# Patient Record
Sex: MALE | Race: WHITE | NOT HISPANIC OR LATINO | Employment: FULL TIME | URBAN - NONMETROPOLITAN AREA
[De-identification: names, ages, dates, MRNs, and addresses within clinical notes are randomized per-mention and may not be internally consistent; named-entity substitution may affect disease eponyms.]

---

## 2017-01-05 PROBLEM — E78.5 HYPERLIPIDEMIA: Status: ACTIVE | Noted: 2017-01-05

## 2017-01-05 PROBLEM — I10 HYPERTENSION: Status: ACTIVE | Noted: 2017-01-05

## 2017-03-20 RX ORDER — LISINOPRIL 10 MG/1
TABLET ORAL
Qty: 90 TABLET | Refills: 3 | Status: SHIPPED | OUTPATIENT
Start: 2017-03-20 | End: 2017-03-20 | Stop reason: SDUPTHER

## 2017-03-20 RX ORDER — LISINOPRIL 10 MG/1
10 TABLET ORAL DAILY
Qty: 90 TABLET | Refills: 3 | Status: SHIPPED | OUTPATIENT
Start: 2017-03-20 | End: 2018-04-02

## 2017-03-31 ENCOUNTER — OFFICE VISIT (OUTPATIENT)
Dept: INTERNAL MEDICINE | Facility: CLINIC | Age: 37
End: 2017-03-31

## 2017-03-31 VITALS
BODY MASS INDEX: 33.61 KG/M2 | TEMPERATURE: 98.4 F | DIASTOLIC BLOOD PRESSURE: 70 MMHG | HEART RATE: 88 BPM | SYSTOLIC BLOOD PRESSURE: 115 MMHG | OXYGEN SATURATION: 98 % | HEIGHT: 67 IN | WEIGHT: 214.13 LBS

## 2017-03-31 DIAGNOSIS — I10 ESSENTIAL HYPERTENSION: Primary | ICD-10-CM

## 2017-03-31 DIAGNOSIS — Z00.00 ROUTINE GENERAL MEDICAL EXAMINATION AT A HEALTH CARE FACILITY: ICD-10-CM

## 2017-03-31 DIAGNOSIS — E78.00 PURE HYPERCHOLESTEROLEMIA: ICD-10-CM

## 2017-03-31 PROCEDURE — 99395 PREV VISIT EST AGE 18-39: CPT | Performed by: INTERNAL MEDICINE

## 2017-03-31 NOTE — PROGRESS NOTES
Chief Complaint   Patient presents with   • Annual Exam       Kenroy Melendez is a 37 y.o. male and is here for a comprehensive physical exam. The patient reports htn.     History:  No nocturia or ED  Do you take any herbs or supplements that were not prescribed by a doctor? no  Are you taking calcium supplements? no  Are you taking aspirin daily? no      Health Habits:  Dental Exam. up to date  Eye Exam. unknown  Exercise: 2 times/week.  Current exercise activities include: coaching (soccer, baseball) and walking    Health Maintenance   Topic Date Due   • TDAP/TD VACCINES (1 - Tdap) 02/17/1999   • INFLUENZA VACCINE  08/01/2016   • LIPID PANEL  03/20/2017       PMH, PSH, SocHx, FamHx, Allergies, and Medications: Reviewed and updated in the Visit Navigator.     No Known Allergies  Past Medical History:   Diagnosis Date   • Right ankle pain      No past surgical history on file.  Social History     Social History   • Marital status:      Spouse name: N/A   • Number of children: N/A   • Years of education: N/A     Occupational History   • Not on file.     Social History Main Topics   • Smoking status: Former Smoker   • Smokeless tobacco: Not on file   • Alcohol use Yes      Comment: 3 times weekly   • Drug use: No   • Sexual activity: Not on file     Other Topics Concern   • Not on file     Social History Narrative     Family History   Problem Relation Age of Onset   • Hypertension Other        Review of Systems  Review of Systems   Constitutional: Negative.  Negative for activity change, appetite change, fatigue and fever.   HENT: Negative for congestion, ear discharge, ear pain and trouble swallowing.    Eyes: Negative for photophobia and visual disturbance.   Respiratory: Negative for cough and shortness of breath.    Cardiovascular: Negative for chest pain and palpitations.   Gastrointestinal: Negative for abdominal distention, abdominal pain, constipation, diarrhea, nausea and vomiting.   Endocrine:  "Negative.    Genitourinary: Negative for dysuria, hematuria and urgency.   Musculoskeletal: Negative for arthralgias, back pain, joint swelling and myalgias.   Skin: Negative for color change and rash.   Allergic/Immunologic: Negative.    Neurological: Negative for dizziness, weakness, light-headedness and headaches.   Hematological: Negative for adenopathy. Does not bruise/bleed easily.   Psychiatric/Behavioral: Negative for agitation, confusion and dysphoric mood. The patient is not nervous/anxious.        Vitals:    03/31/17 1520   BP: 115/70   Pulse: 88   Temp: 98.4 °F (36.9 °C)   SpO2: 98%       Objective   /70  Pulse 88  Temp 98.4 °F (36.9 °C)  Ht 67\" (170.2 cm)  Wt 214 lb 2 oz (97.1 kg)  SpO2 98%  BMI 33.54 kg/m2    Physical Exam   Constitutional: He is oriented to person, place, and time. He appears well-developed and well-nourished. No distress.   HENT:   Nose: Nose normal.   Mouth/Throat: Oropharynx is clear and moist.   Eyes: Conjunctivae and EOM are normal. No scleral icterus.   Neck: No tracheal deviation present. No thyromegaly present.   Cardiovascular: Normal rate and regular rhythm.  Exam reveals no friction rub.    No murmur heard.  Pulmonary/Chest: No respiratory distress. He has no wheezes. He has no rales.   Abdominal: Soft. He exhibits no distension and no mass. There is no tenderness. There is no guarding.   Musculoskeletal: Normal range of motion. He exhibits no deformity.   Lymphadenopathy:     He has no cervical adenopathy.   Neurological: He is alert and oriented to person, place, and time. He has normal reflexes. No cranial nerve deficit. Coordination normal.   Skin: Skin is warm and dry. No rash noted. No erythema.   Psychiatric: He has a normal mood and affect. His behavior is normal. Judgment and thought content normal.        Assessment/Plan   1. Healthy male exam.   2. Patient Counseling: Including but not Limited to the following, when appropriate:  --Nutrition: Stressed " importance of moderation in sodium/caffeine intake, saturated fat and cholesterol, caloric balance, sufficient intake of fresh fruits, vegetables, fiber, calcium, iron    --Discussed the issue of estrogen replacement, calcium supplement, and the daily use of baby aspirin.    --Exercise: Stressed the importance of regular exercise.     --Substance Abuse: Discussed cessation/primary prevention of tobacco, alcohol, or other drug use; driving or other dangerous activities under the influence; availability of treatment for abuse, as indicated based on social history.      --Sexuality: Discussed sexually transmitted diseases, partner selection, use of condoms, avoidance of unintended pregnancy  and contraceptive alternatives.     --Injury prevention: Discussed safety belts, safety helmets, smoke detector, smoking near bedding or upholstery.     --Dental health: Discussed importance of regular tooth brushing, flossing, and dental visits.    --Immunizations reviewed.        3. Discussed the patient's BMI with him.  The BMI is in the acceptable range  4. No Follow-up on file.  5. Age-appropriate Screening Scheduled  6. There are no Patient Instructions on file for this visit.    Assessment/Plan     Kenroy was seen today for annual exam.    Diagnoses and all orders for this visit:    Essential hypertension stable with current meds and low-salt diet    Pure hypercholesterolemia check lipid profile. Discussed dietary restrictions

## 2018-04-02 ENCOUNTER — OFFICE VISIT (OUTPATIENT)
Dept: INTERNAL MEDICINE | Facility: CLINIC | Age: 38
End: 2018-04-02

## 2018-04-02 VITALS
TEMPERATURE: 98.3 F | HEART RATE: 87 BPM | DIASTOLIC BLOOD PRESSURE: 80 MMHG | WEIGHT: 204 LBS | OXYGEN SATURATION: 98 % | BODY MASS INDEX: 32.02 KG/M2 | HEIGHT: 67 IN | SYSTOLIC BLOOD PRESSURE: 120 MMHG

## 2018-04-02 DIAGNOSIS — E78.00 PURE HYPERCHOLESTEROLEMIA: Primary | ICD-10-CM

## 2018-04-02 DIAGNOSIS — I10 ESSENTIAL HYPERTENSION: ICD-10-CM

## 2018-04-02 PROCEDURE — 99213 OFFICE O/P EST LOW 20 MIN: CPT | Performed by: INTERNAL MEDICINE

## 2018-04-02 RX ORDER — AMLODIPINE BESYLATE 2.5 MG/1
2.5 TABLET ORAL DAILY
Qty: 30 TABLET | Refills: 5 | Status: SHIPPED | OUTPATIENT
Start: 2018-04-02 | End: 2018-06-07 | Stop reason: ALTCHOICE

## 2018-04-02 RX ORDER — LISINOPRIL 10 MG/1
TABLET ORAL
Qty: 90 TABLET | Refills: 3 | OUTPATIENT
Start: 2018-04-02

## 2018-04-02 NOTE — PROGRESS NOTES
"Subjective  Kenroy Melendez is a 38 y.o. male    HPI coming in for wellness visit and follow-up has hypertension has been complaining of occasional scratchy throat which she thinks is related to his medication. Nonsmoker no fever or chills has managed some weight loss with dietary restrictions    The following portions of the patient's history were reviewed and updated as appropriate: allergies, current medications, past family history, past medical history, past social history, past surgical history, and problem list.     Review of Systems   Constitutional: Negative.  Negative for activity change, appetite change, fatigue and fever.   HENT: Negative for congestion, ear discharge, ear pain and trouble swallowing.    Eyes: Negative for photophobia and visual disturbance.   Respiratory: Positive for cough. Negative for shortness of breath.    Cardiovascular: Negative for chest pain and palpitations.   Gastrointestinal: Negative for abdominal distention, abdominal pain, constipation, diarrhea, nausea and vomiting.   Endocrine: Negative.    Genitourinary: Negative for dysuria, hematuria and urgency.   Musculoskeletal: Negative for back pain, joint swelling and myalgias.   Skin: Negative for color change and rash.   Allergic/Immunologic: Negative.    Neurological: Negative for dizziness, weakness, light-headedness and headaches.   Hematological: Negative for adenopathy. Does not bruise/bleed easily.   Psychiatric/Behavioral: Negative for agitation, confusion and dysphoric mood. The patient is not nervous/anxious.        Visit Vitals  /80   Pulse 87   Temp 98.3 °F (36.8 °C)   Ht 170.2 cm (67\")   Wt 92.5 kg (204 lb)   SpO2 98%   BMI 31.95 kg/m²       Objective  Physical Exam   Constitutional: He is oriented to person, place, and time. He appears well-developed and well-nourished. No distress.   HENT:   Nose: Nose normal.   Mouth/Throat: Oropharynx is clear and moist.   Eyes: Conjunctivae and EOM are normal. No " scleral icterus.   Neck: No tracheal deviation present. No thyromegaly present.   Cardiovascular: Normal rate and regular rhythm.  Exam reveals no friction rub.    No murmur heard.  Pulmonary/Chest: No respiratory distress. He has no wheezes. He has no rales.   Abdominal: Soft. He exhibits no distension and no mass. There is no tenderness. There is no guarding.   Musculoskeletal: Normal range of motion. He exhibits no deformity.   Lymphadenopathy:     He has no cervical adenopathy.   Neurological: He is alert and oriented to person, place, and time. He has normal reflexes. No cranial nerve deficit. Coordination normal.   Skin: Skin is warm and dry. No rash noted. No erythema.   Psychiatric: He has a normal mood and affect. His behavior is normal. Judgment and thought content normal.       Diagnoses and all orders for this visit:  Patient up to date with immunization's. Discussed exercise program follow BP readings at home. Check routine lab work  Pure hypercholesterolemia. Check lipid profile discussed diet    Essential hypertension switch antihypertensive to amlodipine. Follow BP readings at home

## 2018-06-05 ENCOUNTER — TELEPHONE (OUTPATIENT)
Dept: INTERNAL MEDICINE | Facility: CLINIC | Age: 38
End: 2018-06-05

## 2018-06-05 NOTE — TELEPHONE ENCOUNTER
Patient states that at his last appointment Dr. Carranza switched him to Amlodipine. Patient states it makes his muscles sore and he is requesting to go back to lisinopril.

## 2018-06-05 NOTE — TELEPHONE ENCOUNTER
Patient states that when he was here on [facility] 4/2/2018 that he has received a billed because he has a deductible. He said that this was his yearly check up and should have been billed as a physical. Please advise.

## 2018-06-07 RX ORDER — LISINOPRIL 10 MG/1
10 TABLET ORAL DAILY
Qty: 90 TABLET | Refills: 3 | Status: SHIPPED | OUTPATIENT
Start: 2018-06-07 | End: 2019-06-17 | Stop reason: SDUPTHER

## 2018-06-07 NOTE — TELEPHONE ENCOUNTER
Patient came to office requesting to be switched back to Lisinopril, sent ok per Dr. Carranza, patient informed, Amlodipine discontinued.

## 2019-06-17 RX ORDER — LISINOPRIL 10 MG/1
10 TABLET ORAL DAILY
Qty: 90 TABLET | Refills: 3 | Status: SHIPPED | OUTPATIENT
Start: 2019-06-17 | End: 2020-05-20 | Stop reason: SDUPTHER

## 2020-04-20 RX ORDER — LISINOPRIL 10 MG/1
10 TABLET ORAL DAILY
Qty: 90 TABLET | Refills: 3 | OUTPATIENT
Start: 2020-04-20

## 2020-05-20 RX ORDER — LISINOPRIL 10 MG/1
10 TABLET ORAL DAILY
Qty: 30 TABLET | Refills: 0 | Status: SHIPPED | OUTPATIENT
Start: 2020-05-20 | End: 2020-05-28

## 2020-05-28 RX ORDER — LISINOPRIL 10 MG/1
10 TABLET ORAL DAILY
Qty: 30 TABLET | Refills: 0 | Status: SHIPPED | OUTPATIENT
Start: 2020-05-28 | End: 2020-07-29 | Stop reason: SDUPTHER

## 2020-07-23 RX ORDER — LISINOPRIL 10 MG/1
10 TABLET ORAL DAILY
Qty: 30 TABLET | Refills: 0 | OUTPATIENT
Start: 2020-07-23

## 2020-07-29 RX ORDER — LISINOPRIL 10 MG/1
10 TABLET ORAL DAILY
Qty: 90 TABLET | Refills: 3 | Status: SHIPPED | OUTPATIENT
Start: 2020-07-29 | End: 2021-08-04 | Stop reason: SDUPTHER

## 2020-07-29 NOTE — TELEPHONE ENCOUNTER
PT CALLED REQUESTING A REFILL FOR:  lisinopril (PRINIVIL,ZESTRIL) 10 MG tablet    [facility] - [place]

## 2020-07-30 ENCOUNTER — OFFICE VISIT (OUTPATIENT)
Dept: INTERNAL MEDICINE | Facility: CLINIC | Age: 40
End: 2020-07-30

## 2020-07-30 VITALS
WEIGHT: 192.8 LBS | SYSTOLIC BLOOD PRESSURE: 130 MMHG | BODY MASS INDEX: 30.26 KG/M2 | HEIGHT: 67 IN | TEMPERATURE: 98.2 F | HEART RATE: 91 BPM | DIASTOLIC BLOOD PRESSURE: 80 MMHG | OXYGEN SATURATION: 98 %

## 2020-07-30 DIAGNOSIS — Z00.00 ROUTINE GENERAL MEDICAL EXAMINATION AT A HEALTH CARE FACILITY: Primary | ICD-10-CM

## 2020-07-30 LAB
CHOLEST SERPL-MCNC: 202 MG/DL (ref 0–200)
HDLC SERPL-MCNC: 46 MG/DL (ref 40–60)
LDLC SERPL CALC-MCNC: 135 MG/DL (ref 0–100)
TRIGL SERPL-MCNC: 104 MG/DL (ref 0–150)
VLDLC SERPL CALC-MCNC: 20.8 MG/DL

## 2020-07-30 PROCEDURE — 99396 PREV VISIT EST AGE 40-64: CPT | Performed by: INTERNAL MEDICINE

## 2020-07-30 NOTE — PROGRESS NOTES
Chief Complaint   Patient presents with   • Annual Exam       Kenroy Melendez is a 40 y.o. male and is here for a comprehensive physical exam. The patient reports no problems.     History:  Erectile dysfunction  no  Nocturia  no      Do you take any herbs or supplements that were not prescribed by a doctor? no    Are you taking aspirin daily? no      Health Habits:  Dental Exam. unknown  Eye Exam. unknown  Exercise: 2 times/week.  Current exercise activities include: housecleaning and yard work    Health Maintenance   Topic Date Due   • TDAP/TD VACCINES (1 - Tdap) 02/17/1991   • HEPATITIS C SCREENING  01/05/2017   • LIPID PANEL  03/20/2017   • INFLUENZA VACCINE  08/01/2020   • ANNUAL PHYSICAL  07/31/2021       PMH, PSH, SocHx, FamHx, Allergies, and Medications: Reviewed and updated in the Visit Navigator.     No Known Allergies  Past Medical History:   Diagnosis Date   • Hypertension    • Right ankle pain      No past surgical history on file.  Social History     Socioeconomic History   • Marital status:      Spouse name: Not on file   • Number of children: Not on file   • Years of education: Not on file   • Highest education level: Not on file   Tobacco Use   • Smoking status: Former Smoker   • Smokeless tobacco: Never Used   Substance and Sexual Activity   • Alcohol use: Yes     Comment: 3 times weekly   • Drug use: No   • Sexual activity: Defer     Family History   Problem Relation Age of Onset   • Hypertension Other    • No Known Problems Mother    • No Known Problems Father        Review of Systems  Review of Systems   Constitutional: Negative.  Negative for activity change, appetite change, fatigue and fever.   HENT: Negative for congestion, ear discharge, ear pain and trouble swallowing.    Eyes: Negative for photophobia and visual disturbance.   Respiratory: Negative for cough and shortness of breath.    Cardiovascular: Negative for chest pain and palpitations.   Gastrointestinal: Negative for  "abdominal distention, abdominal pain, constipation, diarrhea, nausea and vomiting.   Endocrine: Negative.    Genitourinary: Negative for dysuria, hematuria and urgency.   Musculoskeletal: Negative for arthralgias, back pain, joint swelling and myalgias.   Skin: Negative for color change and rash.   Allergic/Immunologic: Negative.    Neurological: Negative for dizziness, weakness, light-headedness and headaches.   Hematological: Negative for adenopathy. Does not bruise/bleed easily.   Psychiatric/Behavioral: Negative for agitation, confusion and dysphoric mood. The patient is not nervous/anxious.        Vitals:    07/30/20 0840   BP: 130/80   Pulse: 91   Temp: 98.2 °F (36.8 °C)   SpO2: 98%       Objective   /80   Pulse 91   Temp 98.2 °F (36.8 °C) (Temporal)   Ht 170.2 cm (67\")   Wt 87.5 kg (192 lb 12.8 oz)   SpO2 98%   BMI 30.20 kg/m²     Physical Exam   Constitutional: He is oriented to person, place, and time. He appears well-developed and well-nourished. No distress.   HENT:   Nose: Nose normal.   Mouth/Throat: Oropharynx is clear and moist.   Eyes: Conjunctivae and EOM are normal. No scleral icterus.   Neck: No tracheal deviation present. No thyromegaly present.   Cardiovascular: Normal rate and regular rhythm. Exam reveals no friction rub.   No murmur heard.  Pulmonary/Chest: No respiratory distress. He has no wheezes. He has no rales.   Abdominal: Soft. He exhibits no distension and no mass. There is no tenderness. There is no guarding.   Musculoskeletal: Normal range of motion. He exhibits no deformity.   Lymphadenopathy:     He has no cervical adenopathy.   Neurological: He is alert and oriented to person, place, and time. He has normal reflexes. No cranial nerve deficit. Coordination normal.   Skin: Skin is warm and dry. No rash noted. No erythema.   Psychiatric: He has a normal mood and affect. His behavior is normal. Judgment and thought content normal.       The CVD Risk score (SANTINOAgostino et " al., 2008) failed to calculate for the following reasons:    Cannot find a previous HDL lab    Cannot find a previous total cholesterol lab    Lab Results   Component Value Date     (H) 03/13/2015     Glucose   Date Value Ref Range Status   03/13/2015 80 70 - 100 mg/dL Final     BUN   Date Value Ref Range Status   03/13/2015 18 9 - 23 mg/dL Final     Creatinine   Date Value Ref Range Status   03/13/2015 0.8 0.6 - 1.3 mg/dL Final     Sodium   Date Value Ref Range Status   03/13/2015 137 132 - 146 mmol/L Final     Potassium   Date Value Ref Range Status   03/13/2015 4.5 3.5 - 5.5 mmol/L Final     Chloride   Date Value Ref Range Status   03/13/2015 103 99 - 109 mmol/L Final     CO2   Date Value Ref Range Status   03/13/2015 30 20 - 31 mmol/L Final     Calcium   Date Value Ref Range Status   03/13/2015 9.3 8.7 - 10.4 mg/dL Final     Total Protein   Date Value Ref Range Status   03/13/2015 7.0 5.7 - 8.2 g/dL Final     Albumin   Date Value Ref Range Status   03/13/2015 4.2 3.2 - 4.8 g/dL Final     ALT (SGPT)   Date Value Ref Range Status   03/13/2015 26 7 - 40 Units/L Final     AST (SGOT)   Date Value Ref Range Status   03/13/2015 24 0 - 33 Units/L Final     Alkaline Phosphatase   Date Value Ref Range Status   03/13/2015 58 25 - 100 Units/L Final     Total Bilirubin   Date Value Ref Range Status   03/13/2015 0.4 0.3 - 1.2 mg/dL Final     Anion Gap   Date Value Ref Range Status   03/13/2015 4 3 - 11 mmol/L Final     No results found for: WBC, HGB, HCT, MCV, PLT    Assessment/Plan   1. Healthy male exam.   2. Patient Counseling: Including but not Limited to the following, when appropriate:  --Nutrition: Stressed importance of moderation in sodium/caffeine intake, saturated fat and cholesterol, caloric balance, sufficient intake of fresh fruits, vegetables, fiber  .--Discussed the issue of daily use of baby aspirin.  --Exercise: Stressed the importance of regular exercise.   --Substance Abuse: Discussed  cessation/primary prevention of tobacco, alcohol, or other drug use; driving or other dangerous activities under the influence; availability of treatment for abuse, as indicated based on social history.    --Sexuality: Discussed sexually transmitted diseases, partner selection, use of condoms and contraceptive alternatives.   --Injury prevention: Discussed safety belts, safety helmets, smoke detector, smoking near bedding or upholstery.   --Dental health: Discussed importance of regular tooth brushing, flossing, and dental visits.  --Immunizations reviewed.  -      3. Discussed the patient's BMI with him.  The BMI is in the acceptable range  4. No follow-ups on file.  5. Age-appropriate Screening Scheduled  6. There are no Patient Instructions on file for this visit.    Assessment/Plan     Kenroy was seen today for annual exam.    Diagnoses and all orders for this visit:    Routine general medical examination at a health care facility    Check lipid panel CMP along with hep C antibody

## 2020-07-31 LAB
ALBUMIN SERPL-MCNC: 4.5 G/DL (ref 3.5–5.2)
ALBUMIN/GLOB SERPL: 1.8 G/DL
ALP SERPL-CCNC: 39 U/L (ref 39–117)
ALT SERPL-CCNC: 14 U/L (ref 1–41)
AST SERPL-CCNC: 15 U/L (ref 1–40)
BILIRUB SERPL-MCNC: 0.3 MG/DL (ref 0–1.2)
BUN SERPL-MCNC: 13 MG/DL (ref 6–20)
BUN/CREAT SERPL: 13.4 (ref 7–25)
CALCIUM SERPL-MCNC: 9.9 MG/DL (ref 8.6–10.5)
CHLORIDE SERPL-SCNC: 102 MMOL/L (ref 98–107)
CO2 SERPL-SCNC: 29.8 MMOL/L (ref 22–29)
CREAT SERPL-MCNC: 0.97 MG/DL (ref 0.76–1.27)
GLOBULIN SER CALC-MCNC: 2.5 GM/DL
GLUCOSE SERPL-MCNC: 84 MG/DL (ref 65–99)
HCV AB S/CO SERPL IA: <0.1 S/CO RATIO (ref 0–0.9)
POTASSIUM SERPL-SCNC: 4.4 MMOL/L (ref 3.5–5.2)
PROT SERPL-MCNC: 7 G/DL (ref 6–8.5)
SODIUM SERPL-SCNC: 138 MMOL/L (ref 136–145)

## 2021-08-04 DIAGNOSIS — I10 ESSENTIAL HYPERTENSION: Primary | ICD-10-CM

## 2021-08-04 RX ORDER — LISINOPRIL 10 MG/1
10 TABLET ORAL DAILY
Qty: 20 TABLET | Refills: 0 | Status: SHIPPED | OUTPATIENT
Start: 2021-08-04 | End: 2021-08-30 | Stop reason: SDUPTHER

## 2021-08-16 ENCOUNTER — OFFICE VISIT (OUTPATIENT)
Dept: INTERNAL MEDICINE | Facility: CLINIC | Age: 41
End: 2021-08-16

## 2021-08-16 VITALS
HEIGHT: 67 IN | WEIGHT: 204.12 LBS | TEMPERATURE: 98.2 F | BODY MASS INDEX: 32.04 KG/M2 | OXYGEN SATURATION: 98 % | HEART RATE: 97 BPM | DIASTOLIC BLOOD PRESSURE: 78 MMHG | SYSTOLIC BLOOD PRESSURE: 130 MMHG

## 2021-08-16 DIAGNOSIS — Z00.00 ROUTINE GENERAL MEDICAL EXAMINATION AT A HEALTH CARE FACILITY: Primary | ICD-10-CM

## 2021-08-16 PROCEDURE — 99396 PREV VISIT EST AGE 40-64: CPT | Performed by: INTERNAL MEDICINE

## 2021-08-16 NOTE — PROGRESS NOTES
Chief Complaint   Patient presents with   • Annual Exam       Kenroy Melendez is a 41 y.o. male and is here for a comprehensive physical exam. The patient reports no problems.     History:  Erectile dysfunction  no  Nocturia  no      Do you take any herbs or supplements that were not prescribed by a doctor? yes    Are you taking aspirin daily? no      Health Habits:  Dental Exam. up to date  Eye Exam. up to date  Exercise: 5 times/week.  Current exercise activities include: yard work    Health Maintenance   Topic Date Due   • COVID-19 Vaccine (1) Never done   • TDAP/TD VACCINES (1 - Tdap) Never done   • LIPID PANEL  07/30/2021   • INFLUENZA VACCINE  10/01/2021   • ANNUAL PHYSICAL  08/17/2022   • HEPATITIS C SCREENING  Completed   • Pneumococcal Vaccine 0-64  Aged Out       PMH, PSH, SocHx, FamHx, Allergies, and Medications: Reviewed and updated in the Visit Navigator.     No Known Allergies  Past Medical History:   Diagnosis Date   • Hypertension    • Right ankle pain      No past surgical history on file.  Social History     Socioeconomic History   • Marital status:      Spouse name: Not on file   • Number of children: Not on file   • Years of education: Not on file   • Highest education level: Not on file   Tobacco Use   • Smoking status: Former Smoker   • Smokeless tobacco: Never Used   Substance and Sexual Activity   • Alcohol use: Yes     Comment: 3 times weekly   • Drug use: No   • Sexual activity: Defer     Family History   Problem Relation Age of Onset   • Hypertension Other    • No Known Problems Mother    • No Known Problems Father        Review of Systems  Review of Systems   Constitutional: Negative.  Negative for activity change, appetite change, fatigue and fever.   HENT: Negative for congestion, ear discharge, ear pain and trouble swallowing.    Eyes: Negative for photophobia and visual disturbance.   Respiratory: Negative for cough and shortness of breath.    Cardiovascular: Negative for  "chest pain and palpitations.   Gastrointestinal: Negative for abdominal distention, abdominal pain, constipation, diarrhea, nausea and vomiting.   Endocrine: Negative.    Genitourinary: Negative for dysuria, hematuria and urgency.   Musculoskeletal: Negative for arthralgias, back pain, joint swelling and myalgias.   Skin: Negative for color change and rash.   Allergic/Immunologic: Negative.    Neurological: Negative for dizziness, weakness, light-headedness and headaches.   Hematological: Negative for adenopathy. Does not bruise/bleed easily.   Psychiatric/Behavioral: Negative for agitation, confusion and dysphoric mood. The patient is not nervous/anxious.        Vitals:    08/16/21 1532   BP: 130/78   Pulse: 97   Temp: 98.2 °F (36.8 °C)   SpO2: 98%       Objective   /78   Pulse 97   Temp 98.2 °F (36.8 °C) (Infrared)   Ht 170.2 cm (67\")   Wt 92.6 kg (204 lb 1.9 oz)   SpO2 98%   BMI 31.97 kg/m²     Physical Exam  Constitutional:       General: He is not in acute distress.     Appearance: He is well-developed.   HENT:      Nose: Nose normal.   Eyes:      General: No scleral icterus.     Conjunctiva/sclera: Conjunctivae normal.   Neck:      Thyroid: No thyromegaly.      Trachea: No tracheal deviation.   Cardiovascular:      Rate and Rhythm: Normal rate and regular rhythm.      Heart sounds: No murmur heard.   No friction rub.   Pulmonary:      Effort: No respiratory distress.      Breath sounds: No wheezing or rales.   Abdominal:      General: There is no distension.      Palpations: Abdomen is soft. There is no mass.      Tenderness: There is no abdominal tenderness. There is no guarding.   Musculoskeletal:         General: No deformity. Normal range of motion.   Lymphadenopathy:      Cervical: No cervical adenopathy.   Skin:     General: Skin is warm and dry.      Findings: No erythema or rash.   Neurological:      Mental Status: He is alert and oriented to person, place, and time.      Cranial Nerves: No " cranial nerve deficit.      Coordination: Coordination normal.      Deep Tendon Reflexes: Reflexes are normal and symmetric.   Psychiatric:         Behavior: Behavior normal.         Thought Content: Thought content normal.         Judgment: Judgment normal.         The 10-year CVD risk score (Francisco et al., 2008) is: 6.9%    Values used to calculate the score:      Age: 41 years      Sex: Male      Diabetic: No      Tobacco smoker: No      Systolic Blood Pressure: 130 mmHg      Is BP treated: Yes      HDL Cholesterol: 46 mg/dL      Total Cholesterol: 202 mg/dL    Lab Results   Component Value Date    CHLPL 202 (H) 07/30/2020    TRIG 104 07/30/2020    HDL 46 07/30/2020     (H) 07/30/2020     Glucose   Date Value Ref Range Status   03/13/2015 80 70 - 100 mg/dL Final     BUN   Date Value Ref Range Status   07/30/2020 13 6 - 20 mg/dL Final     Creatinine   Date Value Ref Range Status   07/30/2020 0.97 0.76 - 1.27 mg/dL Final     Sodium   Date Value Ref Range Status   07/30/2020 138 136 - 145 mmol/L Final     Potassium   Date Value Ref Range Status   07/30/2020 4.4 3.5 - 5.2 mmol/L Final     Chloride   Date Value Ref Range Status   07/30/2020 102 98 - 107 mmol/L Final     Total CO2   Date Value Ref Range Status   07/30/2020 29.8 (H) 22.0 - 29.0 mmol/L Final     Calcium   Date Value Ref Range Status   07/30/2020 9.9 8.6 - 10.5 mg/dL Final     Total Protein   Date Value Ref Range Status   03/13/2015 7.0 5.7 - 8.2 g/dL Final     Albumin   Date Value Ref Range Status   07/30/2020 4.50 3.50 - 5.20 g/dL Final     ALT (SGPT)   Date Value Ref Range Status   07/30/2020 14 1 - 41 U/L Final     AST (SGOT)   Date Value Ref Range Status   07/30/2020 15 1 - 40 U/L Final     Alkaline Phosphatase   Date Value Ref Range Status   07/30/2020 39 39 - 117 U/L Final     Total Bilirubin   Date Value Ref Range Status   07/30/2020 0.3 0.0 - 1.2 mg/dL Final     eGFR Non  Am   Date Value Ref Range Status   07/30/2020 86 >60  mL/min/1.73 Final     A/G Ratio   Date Value Ref Range Status   07/30/2020 1.8 g/dL Final     BUN/Creatinine Ratio   Date Value Ref Range Status   07/30/2020 13.4 7.0 - 25.0 Final     Anion Gap   Date Value Ref Range Status   03/13/2015 4 3 - 11 mmol/L Final     No results found for: WBC, HGB, HCT, MCV, PLT    Assessment/Plan   1. Healthy male exam.   2. Patient Counseling: Including but not Limited to the following, when appropriate:  --Nutrition: Stressed importance of moderation in sodium/caffeine intake, saturated fat and cholesterol, caloric balance, sufficient intake of fresh fruits, vegetables, fiber  .-  --Exercise: Stressed the importance of regular exercise.   --Substance Abuse: Discussed cessation/primary prevention of tobacco, alcohol, or other drug use; driving or other dangerous activities under the influence; availability of treatment for abuse, as indicated based on social history.    --Sexuality: Discussed sexually transmitted diseases, partner selection, use of condoms and contraceptive alternatives.   --Injury prevention: Discussed safety belts, safety helmets, smoke detector, smoking near bedding or upholstery.   --Dental health: Discussed importance of regular tooth brushing, flossing, and dental visits.  --Immunizations reviewed.  --      3. Discussed the patient's BMI with him.  The BMI is above average; BMI management plan is completed  4. No follow-ups on file.  5. Age-appropriate Screening Scheduled  6. There are no Patient Instructions on file for this visit.    Assessment/Plan     Diagnoses and all orders for this visit:    1. Routine general medical examination at a health care facility ([facility])  Counseled about the need to get the Covid vaccine.  He does not want 1

## 2021-08-30 DIAGNOSIS — I10 ESSENTIAL HYPERTENSION: ICD-10-CM

## 2021-08-30 RX ORDER — LISINOPRIL 10 MG/1
10 TABLET ORAL DAILY
Qty: 90 TABLET | Refills: 3 | Status: SHIPPED | OUTPATIENT
Start: 2021-08-30 | End: 2022-08-29 | Stop reason: SDUPTHER

## 2021-08-30 NOTE — TELEPHONE ENCOUNTER
Rx Refill Note  Requested Prescriptions     Pending Prescriptions Disp Refills   • lisinopril (PRINIVIL,ZESTRIL) 10 MG tablet 90 tablet 3     Sig: Take 1 tablet by mouth Daily.      Last office visit with prescribing clinician: 8/16/2021      Next office visit with prescribing clinician: 8/18/2022   {

## 2022-02-01 PROCEDURE — U0004 COV-19 TEST NON-CDC HGH THRU: HCPCS | Performed by: PHYSICIAN ASSISTANT

## 2022-08-18 ENCOUNTER — TELEPHONE (OUTPATIENT)
Dept: INTERNAL MEDICINE | Facility: CLINIC | Age: 42
End: 2022-08-18

## 2022-08-18 NOTE — TELEPHONE ENCOUNTER
Appointment canceled today. Patient refused to wear a mask, says he will not come in as long as we have a mask mandate. I informed him it has been over a year since last appointment and he may need labs, patient states he does not need labs every year

## 2022-08-18 NOTE — TELEPHONE ENCOUNTER
Caller: Kenroy Melendez    Relationship: Self    Best call back number: [phone]    Requested Prescriptions:    lisinopril (PRINIVIL,ZESTRIL) 10 MG tablet    Pharmacy where request should be sent: [facility]     Additional details provided by patient: PLEASE REFILL    Does the patient have less than a 3 day supply:  [] Yes  [x] No    Ellie Dobbins Rep   08/18/22 10:05 EDT

## 2022-08-29 DIAGNOSIS — I10 ESSENTIAL HYPERTENSION: ICD-10-CM

## 2022-08-29 RX ORDER — LISINOPRIL 10 MG/1
10 TABLET ORAL DAILY
Qty: 90 TABLET | Refills: 3 | Status: SHIPPED | OUTPATIENT
Start: 2022-08-29

## 2023-09-04 DIAGNOSIS — I10 ESSENTIAL HYPERTENSION: ICD-10-CM

## 2023-09-05 NOTE — TELEPHONE ENCOUNTER
Attempted to contact patient but no answer. LVM to call the office back.     Patient needs to schedule annual appointment.

## 2023-09-06 RX ORDER — LISINOPRIL 10 MG/1
10 TABLET ORAL DAILY
Qty: 30 TABLET | Refills: 0 | Status: SHIPPED | OUTPATIENT
Start: 2023-09-06

## 2023-10-02 ENCOUNTER — OFFICE VISIT (OUTPATIENT)
Dept: INTERNAL MEDICINE | Facility: CLINIC | Age: 43
End: 2023-10-02
Payer: COMMERCIAL

## 2023-10-02 VITALS
HEART RATE: 110 BPM | SYSTOLIC BLOOD PRESSURE: 141 MMHG | DIASTOLIC BLOOD PRESSURE: 95 MMHG | BODY MASS INDEX: 28.53 KG/M2 | TEMPERATURE: 98.2 F | HEIGHT: 67 IN | OXYGEN SATURATION: 98 % | WEIGHT: 181.8 LBS

## 2023-10-02 DIAGNOSIS — I10 ESSENTIAL HYPERTENSION: ICD-10-CM

## 2023-10-02 DIAGNOSIS — Z00.00 ROUTINE GENERAL MEDICAL EXAMINATION AT A HEALTH CARE FACILITY: Primary | ICD-10-CM

## 2023-10-02 PROCEDURE — 99396 PREV VISIT EST AGE 40-64: CPT | Performed by: INTERNAL MEDICINE

## 2023-10-02 RX ORDER — LISINOPRIL 10 MG/1
10 TABLET ORAL DAILY
Qty: 90 TABLET | Refills: 3 | Status: SHIPPED | OUTPATIENT
Start: 2023-10-02

## 2023-10-02 NOTE — PROGRESS NOTES
Chief Complaint   Patient presents with   • Annual Exam     No patient complaints.        Kenroy Melendez is a 43 y.o. male and is here for a comprehensive physical exam. The patient reports problems - htn .     History:  Erectile dysfunction  no  Nocturia  no      Do you take any herbs or supplements that were not prescribed by a doctor? yes    Are you taking aspirin daily? no      Health Habits:  Dental Exam. up to date  Eye Exam. up to date  Exercise: 0 times/week.  Current exercise activities include: none    Health Maintenance   Topic Date Due   • COVID-19 Vaccine (1) Never done   • TDAP/TD VACCINES (1 - Tdap) Never done   • LIPID PANEL  07/30/2021   • ANNUAL PHYSICAL  08/16/2022   • BMI FOLLOWUP  08/16/2022   • INFLUENZA VACCINE  03/31/2024 (Originally 8/1/2023)   • HEPATITIS C SCREENING  Completed   • Pneumococcal Vaccine 0-64  Aged Out       PMH, PSH, SocHx, FamHx, Allergies, and Medications: Reviewed and updated in the Visit Navigator.     No Known Allergies  Past Medical History:   Diagnosis Date   • Hypertension    • Right ankle pain      No past surgical history on file.  Social History     Socioeconomic History   • Marital status:    Tobacco Use   • Smoking status: Former   • Smokeless tobacco: Never   • Tobacco comments:     I haven’t had any nicotine of any type for over 20 years.   Substance and Sexual Activity   • Alcohol use: Yes     Comment: 3 times weekly   • Drug use: No   • Sexual activity: Yes     Partners: Female     Birth control/protection: Same-sex partner     Family History   Problem Relation Age of Onset   • Hypertension Other    • No Known Problems Mother    • No Known Problems Father        Review of Systems  Review of Systems   Constitutional: Negative.  Negative for activity change, appetite change, fatigue and fever.   HENT:  Negative for congestion, ear discharge, ear pain and trouble swallowing.    Eyes:  Negative for photophobia and visual disturbance.   Respiratory:   "Negative for cough and shortness of breath.    Cardiovascular:  Negative for chest pain and palpitations.   Gastrointestinal:  Negative for abdominal distention, abdominal pain, constipation, diarrhea, nausea and vomiting.   Endocrine: Negative.    Genitourinary:  Negative for dysuria, hematuria and urgency.   Musculoskeletal:  Positive for arthralgias. Negative for back pain, joint swelling and myalgias.   Skin:  Negative for color change and rash.   Allergic/Immunologic: Negative.    Neurological:  Negative for dizziness, weakness, light-headedness and headaches.   Hematological:  Negative for adenopathy. Does not bruise/bleed easily.   Psychiatric/Behavioral:  Negative for agitation, confusion and dysphoric mood. The patient is not nervous/anxious.      Vitals:    10/02/23 1555   BP: 141/95   Pulse: 110   Temp: 98.2 °F (36.8 °C)   SpO2: 98%       Objective   /95   Pulse 110   Temp 98.2 °F (36.8 °C)   Ht 170.2 cm (67.01\")   Wt 82.5 kg (181 lb 12.8 oz)   SpO2 98%   BMI 28.47 kg/m²     Physical Exam  Constitutional:       General: He is not in acute distress.     Appearance: He is well-developed.   HENT:      Nose: Nose normal.   Eyes:      General: No scleral icterus.     Conjunctiva/sclera: Conjunctivae normal.   Neck:      Thyroid: No thyromegaly.      Trachea: No tracheal deviation.   Cardiovascular:      Rate and Rhythm: Normal rate and regular rhythm.      Heart sounds: No murmur heard.    No friction rub.   Pulmonary:      Effort: No respiratory distress.      Breath sounds: No wheezing or rales.   Abdominal:      General: There is no distension.      Palpations: Abdomen is soft. There is no mass.      Tenderness: There is no abdominal tenderness. There is no guarding.   Musculoskeletal:         General: No deformity. Normal range of motion.   Lymphadenopathy:      Cervical: No cervical adenopathy.   Skin:     General: Skin is warm and dry.      Findings: No erythema or rash.   Neurological:      " Mental Status: He is alert and oriented to person, place, and time.      Cranial Nerves: No cranial nerve deficit.      Coordination: Coordination normal.      Deep Tendon Reflexes: Reflexes are normal and symmetric.   Psychiatric:         Behavior: Behavior normal.         Thought Content: Thought content normal.         Judgment: Judgment normal.       The CVD Risk score (Francisco et al., 2008) failed to calculate for the following reasons:    Cannot find a previous HDL lab    Cannot find a previous total cholesterol lab    Lab Results   Component Value Date    CHLPL 202 (H) 07/30/2020    TRIG 104 07/30/2020    HDL 46 07/30/2020     (H) 07/30/2020     Glucose   Date Value Ref Range Status   07/30/2020 84 65 - 99 mg/dL Final     BUN   Date Value Ref Range Status   07/30/2020 13 6 - 20 mg/dL Final     Creatinine   Date Value Ref Range Status   07/30/2020 0.97 0.76 - 1.27 mg/dL Final     Sodium   Date Value Ref Range Status   07/30/2020 138 136 - 145 mmol/L Final     Potassium   Date Value Ref Range Status   07/30/2020 4.4 3.5 - 5.2 mmol/L Final     Chloride   Date Value Ref Range Status   07/30/2020 102 98 - 107 mmol/L Final     Total CO2   Date Value Ref Range Status   07/30/2020 29.8 (H) 22.0 - 29.0 mmol/L Final     Calcium   Date Value Ref Range Status   07/30/2020 9.9 8.6 - 10.5 mg/dL Final     Total Protein   Date Value Ref Range Status   03/13/2015 7.0 5.7 - 8.2 g/dL Final     Albumin   Date Value Ref Range Status   07/30/2020 4.50 3.50 - 5.20 g/dL Final     ALT (SGPT)   Date Value Ref Range Status   07/30/2020 14 1 - 41 U/L Final     AST (SGOT)   Date Value Ref Range Status   07/30/2020 15 1 - 40 U/L Final     Alkaline Phosphatase   Date Value Ref Range Status   07/30/2020 39 39 - 117 U/L Final     Total Bilirubin   Date Value Ref Range Status   07/30/2020 0.3 0.0 - 1.2 mg/dL Final     eGFR Non  Am   Date Value Ref Range Status   07/30/2020 86 >60 mL/min/1.73 Final     A/G Ratio   Date Value  Ref Range Status   07/30/2020 1.8 g/dL Final     BUN/Creatinine Ratio   Date Value Ref Range Status   07/30/2020 13.4 7.0 - 25.0 Final     Anion Gap   Date Value Ref Range Status   03/13/2015 4 3 - 11 mmol/L Final     No results found for: WBC, HGB, HCT, MCV, PLT    Assessment & Plan   1. Healthy male exam.   2. Patient Counseling: Including but not Limited to the following, when appropriate:  --Nutrition: Stressed importance of moderation in sodium/caffeine intake, saturated fat and cholesterol, caloric balance, sufficient intake of fresh fruits, vegetables, fiber    --Exercise: Stressed the importance of regular exercise.   --Substance Abuse: Discussed cessation/primary prevention of tobacco, alcohol, or other drug use; driving or other dangerous activities under the influence; availability of treatment for abuse, as indicated based on social history.    --Sexuality: Discussed sexually transmitted diseases, partner selection, use of condoms and contraceptive alternatives.   --Injury prevention: Discussed safety belts, safety helmets, smoke detector, smoking near bedding or upholstery.   --Dental health: Discussed importance of regular tooth brushing, flossing, and dental visits.  --Immunizations reviewed.  --Discussed benefits of colon cancer screening.      3. Discussed the patient's BMI with him. BMI is >= 25 and <30. (Overweight) The following options were offered after discussion;: nutrition counseling/recommendations  . The BMI is in the acceptable range  4. No follow-ups on file.  5. Age-appropriate Screening Scheduled  6. There are no Patient Instructions on file for this visit.    Assessment & Plan     Diagnoses and all orders for this visit:    1. Routine general medical examination at a health care facility (Primary)

## 2023-10-03 LAB
ALBUMIN SERPL-MCNC: 4.5 G/DL (ref 4.1–5.1)
ALBUMIN/GLOB SERPL: 1.5 {RATIO} (ref 1.2–2.2)
ALP SERPL-CCNC: 57 IU/L (ref 44–121)
ALT SERPL-CCNC: 16 IU/L (ref 0–44)
AST SERPL-CCNC: 22 IU/L (ref 0–40)
BILIRUB SERPL-MCNC: 0.2 MG/DL (ref 0–1.2)
BUN SERPL-MCNC: 15 MG/DL (ref 6–24)
BUN/CREAT SERPL: 15 (ref 9–20)
CALCIUM SERPL-MCNC: 9.4 MG/DL (ref 8.7–10.2)
CHLORIDE SERPL-SCNC: 100 MMOL/L (ref 96–106)
CHOLEST SERPL-MCNC: 224 MG/DL (ref 100–199)
CO2 SERPL-SCNC: 22 MMOL/L (ref 20–29)
CREAT SERPL-MCNC: 1.03 MG/DL (ref 0.76–1.27)
EGFRCR SERPLBLD CKD-EPI 2021: 92 ML/MIN/1.73
GLOBULIN SER CALC-MCNC: 3 G/DL (ref 1.5–4.5)
GLUCOSE SERPL-MCNC: 75 MG/DL (ref 70–99)
HDLC SERPL-MCNC: 50 MG/DL
LDLC SERPL CALC-MCNC: 157 MG/DL (ref 0–99)
POTASSIUM SERPL-SCNC: 4.2 MMOL/L (ref 3.5–5.2)
PROT SERPL-MCNC: 7.5 G/DL (ref 6–8.5)
SODIUM SERPL-SCNC: 140 MMOL/L (ref 134–144)
TRIGL SERPL-MCNC: 95 MG/DL (ref 0–149)
VLDLC SERPL CALC-MCNC: 17 MG/DL (ref 5–40)

## 2024-10-04 DIAGNOSIS — I10 ESSENTIAL HYPERTENSION: ICD-10-CM

## 2024-10-04 RX ORDER — LISINOPRIL 10 MG/1
10 TABLET ORAL DAILY
Qty: 90 TABLET | Refills: 3 | Status: SHIPPED | OUTPATIENT
Start: 2024-10-04

## 2024-11-08 ENCOUNTER — OFFICE VISIT (OUTPATIENT)
Dept: INTERNAL MEDICINE | Facility: CLINIC | Age: 44
End: 2024-11-08
Payer: COMMERCIAL

## 2024-11-08 VITALS
BODY MASS INDEX: 25.39 KG/M2 | RESPIRATION RATE: 16 BRPM | OXYGEN SATURATION: 98 % | SYSTOLIC BLOOD PRESSURE: 124 MMHG | HEIGHT: 66 IN | HEART RATE: 66 BPM | TEMPERATURE: 97.5 F | WEIGHT: 158 LBS | DIASTOLIC BLOOD PRESSURE: 80 MMHG

## 2024-11-08 DIAGNOSIS — Z00.00 ROUTINE GENERAL MEDICAL EXAMINATION AT A HEALTH CARE FACILITY: Primary | ICD-10-CM

## 2024-11-08 PROCEDURE — 99396 PREV VISIT EST AGE 40-64: CPT | Performed by: INTERNAL MEDICINE

## 2024-11-08 NOTE — PROGRESS NOTES
Chief Complaint   Patient presents with   • Annual Exam       Kenroy Melendez is a 44 y.o. male and is here for a comprehensive physical exam. The patient reports no problems.     History:  Erectile dysfunction  no  Nocturia  no      Do you take any herbs or supplements that were not prescribed by a doctor? no    Are you taking aspirin daily? no      Health Habits:  Dental Exam. not up to date -    Eye Exam. up to date  Exercise: 4 times/week.  Current exercise activities include: walking and yard work    Health Maintenance   Topic Date Due   • ANNUAL PHYSICAL  10/02/2024   • LIPID PANEL  10/02/2024   • INFLUENZA VACCINE  03/31/2025 (Originally 8/1/2024)   • COVID-19 Vaccine (1 - 2024-25 season) 11/07/2025 (Originally 9/1/2024)   • TDAP/TD VACCINES (1 - Tdap) 11/08/2025 (Originally 2/17/1999)   • HEPATITIS C SCREENING  Completed   • Pneumococcal Vaccine 0-64  Aged Out       PMH, PSH, SocHx, FamHx, Allergies, and Medications: Reviewed and updated in the Visit Navigator.     No Known Allergies  Past Medical History:   Diagnosis Date   • Hypertension    • Right ankle pain      History reviewed. No pertinent surgical history.  Social History     Socioeconomic History   • Marital status:    Tobacco Use   • Smoking status: Never   • Smokeless tobacco: Never   • Tobacco comments:     I haven't had any nicotine of any type for over 20 years.   Vaping Use   • Vaping status: Never Used   Substance and Sexual Activity   • Alcohol use: Yes     Comment: 3 times weekly   • Drug use: No   • Sexual activity: Yes     Partners: Female     Birth control/protection: Partner of same sex     Family History   Problem Relation Age of Onset   • No Known Problems Mother    • No Known Problems Father    • Hypertension Other        Review of Systems  Review of Systems   Constitutional: Negative.  Negative for activity change, appetite change, fatigue and fever.   HENT:  Negative for congestion, ear discharge, ear pain and trouble  "swallowing.    Eyes:  Negative for photophobia and visual disturbance.   Respiratory:  Negative for cough and shortness of breath.    Cardiovascular:  Negative for chest pain and palpitations.   Gastrointestinal:  Negative for abdominal distention, abdominal pain, constipation, diarrhea, nausea and vomiting.   Endocrine: Negative.    Genitourinary:  Negative for dysuria, hematuria and urgency.   Musculoskeletal:  Positive for arthralgias. Negative for back pain, joint swelling and myalgias.   Skin:  Negative for color change and rash.   Allergic/Immunologic: Negative.    Neurological:  Negative for dizziness, weakness, light-headedness and headaches.   Hematological:  Negative for adenopathy. Does not bruise/bleed easily.   Psychiatric/Behavioral:  Negative for agitation, confusion and dysphoric mood. The patient is not nervous/anxious.        Vitals:    11/08/24 1531   BP: 124/80   Pulse: 66   Resp: 16   Temp: 97.5 °F (36.4 °C)   SpO2: 98%       Objective   /80   Pulse 66   Temp 97.5 °F (36.4 °C)   Resp 16   Ht 167.6 cm (66\")   Wt 71.7 kg (158 lb)   SpO2 98%   BMI 25.50 kg/m²     Physical Exam  Constitutional:       General: He is not in acute distress.     Appearance: He is well-developed.   HENT:      Nose: Nose normal.   Eyes:      General: No scleral icterus.     Conjunctiva/sclera: Conjunctivae normal.   Neck:      Thyroid: No thyromegaly.      Trachea: No tracheal deviation.   Cardiovascular:      Rate and Rhythm: Normal rate and regular rhythm.      Heart sounds: No murmur heard.     No friction rub.   Pulmonary:      Effort: No respiratory distress.      Breath sounds: No wheezing or rales.   Abdominal:      General: There is no distension.      Palpations: Abdomen is soft. There is no mass.      Tenderness: There is no abdominal tenderness. There is no guarding.   Musculoskeletal:         General: No deformity. Normal range of motion.   Lymphadenopathy:      Cervical: No cervical adenopathy. "   Skin:     General: Skin is warm and dry.      Findings: No erythema or rash.   Neurological:      Mental Status: He is alert and oriented to person, place, and time.      Cranial Nerves: No cranial nerve deficit.      Coordination: Coordination normal.      Deep Tendon Reflexes: Reflexes are normal and symmetric.   Psychiatric:         Behavior: Behavior normal.         Thought Content: Thought content normal.         Judgment: Judgment normal.       The 10-year CVD risk score (JORDAN'Behzad et al., 2008) is: 8.1%    Values used to calculate the score:      Age: 44 years      Sex: Male      Diabetic: No      Tobacco smoker: No      Systolic Blood Pressure: 124 mmHg      Is BP treated: Yes      HDL Cholesterol: 50 mg/dL      Total Cholesterol: 224 mg/dL    Lab Results   Component Value Date    CHLPL 224 (H) 10/02/2023    TRIG 95 10/02/2023    HDL 50 10/02/2023     (H) 10/02/2023     Glucose   Date Value Ref Range Status   10/02/2023 75 70 - 99 mg/dL Final     BUN   Date Value Ref Range Status   10/02/2023 15 6 - 24 mg/dL Final     Creatinine   Date Value Ref Range Status   10/02/2023 1.03 0.76 - 1.27 mg/dL Final     Sodium   Date Value Ref Range Status   10/02/2023 140 134 - 144 mmol/L Final     Potassium   Date Value Ref Range Status   10/02/2023 4.2 3.5 - 5.2 mmol/L Final     Chloride   Date Value Ref Range Status   10/02/2023 100 96 - 106 mmol/L Final     Total CO2   Date Value Ref Range Status   10/02/2023 22 20 - 29 mmol/L Final     Calcium   Date Value Ref Range Status   10/02/2023 9.4 8.7 - 10.2 mg/dL Final     Total Protein   Date Value Ref Range Status   03/13/2015 7.0 5.7 - 8.2 g/dL Final     Albumin   Date Value Ref Range Status   10/02/2023 4.5 4.1 - 5.1 g/dL Final     ALT (SGPT)   Date Value Ref Range Status   10/02/2023 16 0 - 44 IU/L Final     AST (SGOT)   Date Value Ref Range Status   10/02/2023 22 0 - 40 IU/L Final     Alkaline Phosphatase   Date Value Ref Range Status   10/02/2023 57 44 - 121  "IU/L Final     Total Bilirubin   Date Value Ref Range Status   10/02/2023 0.2 0.0 - 1.2 mg/dL Final     eGFR Non  Am   Date Value Ref Range Status   07/30/2020 86 >60 mL/min/1.73 Final     A/G Ratio   Date Value Ref Range Status   10/02/2023 1.5 1.2 - 2.2 Final     BUN/Creatinine Ratio   Date Value Ref Range Status   10/02/2023 15 9 - 20 Final     Anion Gap   Date Value Ref Range Status   03/13/2015 4 3 - 11 mmol/L Final     No results found for: \"WBC\", \"HGB\", \"HCT\", \"MCV\", \"PLT\"    Assessment & Plan   1. Healthy male exam.   2. Patient Counseling: Including but not Limited to the following, when appropriate:  --Nutrition: Stressed importance of moderation in sodium/caffeine intake, saturated fat and cholesterol, caloric balance, sufficient intake of fresh fruits, vegetables, fiber    --Exercise: Stressed the importance of regular exercise.   --Substance Abuse: Discussed cessation/primary prevention of tobacco, alcohol, or other drug use; driving or other dangerous activities under the influence; availability of treatment for abuse, as indicated based on social history.    --Sexuality: Discussed sexually transmitted diseases, partner selection, use of condoms and contraceptive alternatives.   --Injury prevention: Discussed safety belts, safety helmets, smoke detector, smoking near bedding or upholstery.   --Dental health: Discussed importance of regular tooth brushing, flossing, and dental visits.  --Immunizations reviewed.  --Discussed benefits of colon cancer screening.      3. Discussed the patient's BMI with him. BMI is >= 25 and <30. (Overweight) The following options were offered after discussion;: nutrition counseling/recommendations  . The BMI is in the acceptable range  4. No follow-ups on file.  5. Age-appropriate Screening Scheduled  6. There are no Patient Instructions on file for this visit.    Assessment & Plan     Diagnoses and all orders for this visit:    1. Routine general medical " examination at a health care facility (Primary)

## 2024-11-09 LAB
ALBUMIN SERPL-MCNC: 4.5 G/DL (ref 4.1–5.1)
ALP SERPL-CCNC: 50 IU/L (ref 44–121)
ALT SERPL-CCNC: 13 IU/L (ref 0–44)
AST SERPL-CCNC: 20 IU/L (ref 0–40)
BILIRUB SERPL-MCNC: 0.6 MG/DL (ref 0–1.2)
BUN SERPL-MCNC: 12 MG/DL (ref 6–24)
BUN/CREAT SERPL: 14 (ref 9–20)
CALCIUM SERPL-MCNC: 9.6 MG/DL (ref 8.7–10.2)
CHLORIDE SERPL-SCNC: 98 MMOL/L (ref 96–106)
CHOLEST SERPL-MCNC: 204 MG/DL (ref 100–199)
CO2 SERPL-SCNC: 25 MMOL/L (ref 20–29)
CREAT SERPL-MCNC: 0.87 MG/DL (ref 0.76–1.27)
EGFRCR SERPLBLD CKD-EPI 2021: 109 ML/MIN/1.73
GLOBULIN SER CALC-MCNC: 3.2 G/DL (ref 1.5–4.5)
GLUCOSE SERPL-MCNC: 88 MG/DL (ref 70–99)
HDLC SERPL-MCNC: 73 MG/DL
LDLC SERPL CALC-MCNC: 124 MG/DL (ref 0–99)
POTASSIUM SERPL-SCNC: 4.3 MMOL/L (ref 3.5–5.2)
PROT SERPL-MCNC: 7.7 G/DL (ref 6–8.5)
SODIUM SERPL-SCNC: 137 MMOL/L (ref 134–144)
TRIGL SERPL-MCNC: 39 MG/DL (ref 0–149)
VLDLC SERPL CALC-MCNC: 7 MG/DL (ref 5–40)

## 2025-01-18 ENCOUNTER — HOSPITAL ENCOUNTER (EMERGENCY)
Facility: HOSPITAL | Age: 45
Discharge: HOME | End: 2025-01-18
Attending: STUDENT IN AN ORGANIZED HEALTH CARE EDUCATION/TRAINING PROGRAM
Payer: COMMERCIAL

## 2025-01-18 VITALS
SYSTOLIC BLOOD PRESSURE: 146 MMHG | OXYGEN SATURATION: 100 % | RESPIRATION RATE: 18 BRPM | HEART RATE: 100 BPM | HEIGHT: 67 IN | DIASTOLIC BLOOD PRESSURE: 90 MMHG | WEIGHT: 155 LBS | BODY MASS INDEX: 24.33 KG/M2 | TEMPERATURE: 98.5 F
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
## 2025-01-18 DIAGNOSIS — S61.211A LACERATION OF LEFT INDEX FINGER WITHOUT FOREIGN BODY WITHOUT DAMAGE TO NAIL, INITIAL ENCOUNTER: ICD-10-CM

## 2025-01-18 DIAGNOSIS — S61.452A DOG BITE, HAND, LEFT, INITIAL ENCOUNTER: Primary | ICD-10-CM

## 2025-01-18 PROCEDURE — 99283 EMERGENCY DEPT VISIT LOW MDM: CPT | Performed by: STUDENT IN AN ORGANIZED HEALTH CARE EDUCATION/TRAINING PROGRAM

## 2025-01-18 PROCEDURE — 90714 TD VACC NO PRESV 7 YRS+ IM: CPT | Performed by: STUDENT IN AN ORGANIZED HEALTH CARE EDUCATION/TRAINING PROGRAM

## 2025-01-18 PROCEDURE — 25010000002 TETANUS-DIPHTHERIA TOXOIDS (ADULT) PER 0.5 ML: Performed by: STUDENT IN AN ORGANIZED HEALTH CARE EDUCATION/TRAINING PROGRAM

## 2025-01-18 PROCEDURE — 25010000002 LIDOCAINE 1 % SOLUTION: Performed by: STUDENT IN AN ORGANIZED HEALTH CARE EDUCATION/TRAINING PROGRAM

## 2025-01-18 PROCEDURE — 90471 IMMUNIZATION ADMIN: CPT

## 2025-01-18 RX ORDER — LIDOCAINE HYDROCHLORIDE 10 MG/ML
10 INJECTION, SOLUTION INFILTRATION; PERINEURAL ONCE
Status: COMPLETED | OUTPATIENT
Start: 2025-01-18 | End: 2025-01-18

## 2025-01-18 RX ADMIN — AMOXICILLIN AND CLAVULANATE POTASSIUM 1 TABLET: 875; 125 TABLET, FILM COATED ORAL at 21:30

## 2025-01-18 RX ADMIN — LIDOCAINE HYDROCHLORIDE 10 ML: 10 INJECTION, SOLUTION INFILTRATION; PERINEURAL at 21:30

## 2025-01-18 RX ADMIN — CLOSTRIDIUM TETANI TOXOID ANTIGEN (FORMALDEHYDE INACTIVATED) AND CORYNEBACTERIUM DIPHTHERIAE TOXOID ANTIGEN (FORMALDEHYDE INACTIVATED) 0.5 ML: 5; 2 INJECTION, SUSPENSION INTRAMUSCULAR at 21:30

## 2025-01-19 NOTE — ED PROVIDER NOTES
[facility] EMERGENCY DEPARTMENT  Emergency Department Encounter  Emergency Medicine Physician Note       Pt Name: Kenroy Melendez  MRN: [id number]  Pt :   1980  Room Number:    Date of encounter:  2025  PCP: Ruiz Carranza MD  ED Provider: Jasper Mcmahon MD    Historian: Patient      HPI:  Chief Complaint: Dog bite        Context: Kenroy Melendez is a 44 y.o. male who presents to the ED c/o dog bite.  States his dogs were fighting over a bone and he reached into separate them causing them to accidentally bite his hand.  Last tetanus shot around 18 to 20 years ago.  Denies any other injuries.  Still has full flexion and extension of injured fingers.      PAST MEDICAL HISTORY  Past Medical History:   Diagnosis Date    Hypertension     Right ankle pain          PAST SURGICAL HISTORY  History reviewed. No pertinent surgical history.      FAMILY HISTORY  Family History   Problem Relation Age of Onset    No Known Problems Mother     No Known Problems Father     Hypertension Other          SOCIAL HISTORY  Social History     Socioeconomic History    Marital status:    Tobacco Use    Smoking status: Never    Smokeless tobacco: Never    Tobacco comments:     I haven't had any nicotine of any type for over 20 years.   Vaping Use    Vaping status: Never Used   Substance and Sexual Activity    Alcohol use: Yes     Comment: 3 times weekly    Drug use: No    Sexual activity: Yes     Partners: Female     Birth control/protection: Partner of same sex         ALLERGIES  Patient has no known allergies.        REVIEW OF SYSTEMS  Review of Systems     All systems reviewed and negative except for those discussed in HPI.       PHYSICAL EXAM    I have reviewed the triage vital signs and nursing notes.    ED Triage Vitals [25 2105]   Temp Heart Rate Resp BP SpO2   98.5 °F (36.9 °C) 100 18 146/90 100 %      Temp src Heart Rate Source Patient Position BP Location FiO2 (%)   Oral Monitor  Sitting Left arm --       Physical Exam    General:  Awake, alert, no acute distress  HEENT: Atraumatic, normocephalic, EOMI, PERRLA, mucous membranes moist  NECK:  Supple, atraumatic  Cardiovascular:  Regular rate.  all extremities well perfused  Respiratory:  Regular rate, equal chest rise. No resp distress  Abdominal:  Soft, nondistended   Extremity: Laceration of left index finger on proximal phalanx with exposed fatty tissue but no exposed bone or tendon.  Multiple small puncture wounds on index finger and palm of hand with no active bleeding.  No visible bony abnormalities in all 4 extremities.  Full range of motion of all extremities.  Skin:  Warm and dry.   Neuro:  AAOx3, GCS 15.  moving all extremities  Psych:   Normal mood and affect        LAB RESULTS  No results found for this or any previous visit (from the past 24 hours).          RADIOLOGY  No Radiology Exams Resulted Within Past 24 Hours        PROCEDURES    Procedures  LACERATION REPAIR  Consent: Verbal consent obtained.  Risks and benefits: Risk, benefits, and alternatives were discussed.  Risks include: Pain, bleeding, infection, scarring.    Consent given by: Patient  Patient states understanding of the procedure being performed.  Patient's understanding of the procedure matches the consent given.  Patient identity confirmed: Armband    Indication: Laceration  Location: Left index finger  Laceration length: 2.5 cm  Foreign bodies: None  Anesthesia: Local infiltration  Local anesthetic: Lidocaine 1% without epinephrine  Anesthetic total: 2 mL  Patient sedated: No  Patient was prepped and draped in the usual sterile fashion.  Irrigation solution: Saline  Irrigation method: Syringe  Amount of cleaning: Extensive  Debridement: None  Skin closure method: Sutures  Technique: Simple interrupted  Size/type of suture: 4-0 nylon  Number of sutures: 3  Approximation: Close  Dressing: Antibiotic ointment, nonadhesive dressing applied by  nursing.    Complexity: Simple    Patient tolerated the procedure well with no immediate complications.   No orders to display       MEDICATIONS GIVEN IN ER    Medications   lidocaine (XYLOCAINE) 1 % injection 10 mL (10 mL Injection Given 1/18/25 2130)   tetanus-diphtheria toxoids (TENIVAC 5-2) injection 0.5 mL (0.5 mL Intramuscular Given 1/18/25 2130)   amoxicillin-clavulanate (AUGMENTIN) 875-125 MG per tablet 1 tablet (1 tablet Oral Given 1/18/25 2130)         MEDICAL DECISION MAKING, PROGRESS, and CONSULTS    All labs, if obtained, have been independently reviewed by me.  All radiology studies, if obtained, have been evaluated by me and the radiologist dictating the report.  All EKG's, if obtained, have been independently viewed and interpreted by me.      Discussion below represents my analysis of pertinent findings related to patient's condition, differential diagnosis, treatment plan and final disposition.    Kenroy Melendez is a 44 y.o. male who presents to the ED c/o dog bite.  Patient has small puncture wounds on left hand from dog bite from his personal pets.  Dogs are up-to-date on rabies vaccine.  Patient given Tdap booster.  Wound thoroughly cleaned and irrigated and repaired with suture, see procedure note.  Patient given prescription for Augmentin for prophylaxis against infection.  Advised on return precautions.  Patient agreeable with this plan and was discharged.                             Orders placed during this visit:  No orders of the defined types were placed in this encounter.        ED Course:    Consultants: None                Shared Decision Making:  After my consideration of clinical presentation and any laboratory/radiology studies obtained, I discussed the findings with the patient/patient representative who is in agreement with the treatment plan and the final disposition.   Risks and benefits of discharge and/or observation/admission were discussed.      AS OF 22:18 EST  VITALS:    BP - 146/90  HR - 100  TEMP - 98.5 °F (36.9 °C) (Oral)  O2 SATS - 100%                  DIAGNOSIS  Final diagnoses:   Dog bite, hand, left, initial encounter   Laceration of left index finger without foreign body without damage to nail, initial encounter         DISPOSITION  Discharge      Please note that portions of this document were completed with voice recognition software.        Jasper Mcmahon MD  01/19/25 0119

## 2025-01-19 NOTE — DISCHARGE INSTRUCTIONS
Take Augmentin as prescribed twice daily.  Keep wound clean with soap and water daily.  Stitches need to be removed in approximately 7 to 10 days, this can be done here or at primary care doctor's office.  Return emergency department if wound becomes infected despite treatment.